# Patient Record
Sex: FEMALE | Race: WHITE | NOT HISPANIC OR LATINO | URBAN - METROPOLITAN AREA
[De-identification: names, ages, dates, MRNs, and addresses within clinical notes are randomized per-mention and may not be internally consistent; named-entity substitution may affect disease eponyms.]

---

## 2017-01-20 ENCOUNTER — COMMUNICATION - HEALTHEAST (OUTPATIENT)
Dept: FAMILY MEDICINE | Facility: CLINIC | Age: 67
End: 2017-01-20

## 2017-02-01 ENCOUNTER — OFFICE VISIT - HEALTHEAST (OUTPATIENT)
Dept: FAMILY MEDICINE | Facility: CLINIC | Age: 67
End: 2017-02-01

## 2017-02-01 DIAGNOSIS — Z01.818 PREOP EXAMINATION: ICD-10-CM

## 2017-02-01 DIAGNOSIS — F17.200 TOBACCO USE DISORDER: ICD-10-CM

## 2017-02-01 ASSESSMENT — MIFFLIN-ST. JEOR: SCORE: 1097.88

## 2017-02-03 ENCOUNTER — COMMUNICATION - HEALTHEAST (OUTPATIENT)
Dept: FAMILY MEDICINE | Facility: CLINIC | Age: 67
End: 2017-02-03

## 2017-02-06 ASSESSMENT — MIFFLIN-ST. JEOR: SCORE: 1102.41

## 2017-02-07 ENCOUNTER — ANESTHESIA - HEALTHEAST (OUTPATIENT)
Dept: SURGERY | Facility: CLINIC | Age: 67
End: 2017-02-07

## 2017-02-08 ENCOUNTER — SURGERY - HEALTHEAST (OUTPATIENT)
Dept: SURGERY | Facility: CLINIC | Age: 67
End: 2017-02-08

## 2017-02-09 ASSESSMENT — MIFFLIN-ST. JEOR: SCORE: 1102.41

## 2017-02-10 ENCOUNTER — HOME CARE/HOSPICE - HEALTHEAST (OUTPATIENT)
Dept: HOME HEALTH SERVICES | Facility: HOME HEALTH | Age: 67
End: 2017-02-10

## 2017-02-11 ENCOUNTER — COMMUNICATION - HEALTHEAST (OUTPATIENT)
Dept: HOME HEALTH SERVICES | Facility: HOME HEALTH | Age: 67
End: 2017-02-11

## 2017-02-15 ENCOUNTER — RECORDS - HEALTHEAST (OUTPATIENT)
Dept: ADMINISTRATIVE | Facility: OTHER | Age: 67
End: 2017-02-15

## 2017-02-22 ENCOUNTER — RECORDS - HEALTHEAST (OUTPATIENT)
Dept: ADMINISTRATIVE | Facility: OTHER | Age: 67
End: 2017-02-22

## 2017-03-02 ENCOUNTER — RECORDS - HEALTHEAST (OUTPATIENT)
Dept: ADMINISTRATIVE | Facility: OTHER | Age: 67
End: 2017-03-02

## 2017-03-29 ENCOUNTER — RECORDS - HEALTHEAST (OUTPATIENT)
Dept: ADMINISTRATIVE | Facility: OTHER | Age: 67
End: 2017-03-29

## 2017-04-07 ENCOUNTER — COMMUNICATION - HEALTHEAST (OUTPATIENT)
Dept: INTERNAL MEDICINE | Facility: CLINIC | Age: 67
End: 2017-04-07

## 2017-04-07 DIAGNOSIS — F41.8 DEPRESSION WITH ANXIETY: ICD-10-CM

## 2017-04-07 DIAGNOSIS — E78.00 HYPERCHOLESTEREMIA: ICD-10-CM

## 2017-04-07 DIAGNOSIS — E03.9 HYPOTHYROIDISM: ICD-10-CM

## 2017-04-11 RX ORDER — LEVOTHYROXINE SODIUM 88 UG/1
TABLET ORAL
Qty: 90 TABLET | Refills: 0 | Status: SHIPPED | OUTPATIENT
Start: 2017-04-11

## 2017-04-11 RX ORDER — LOVASTATIN 20 MG
TABLET ORAL
Qty: 90 TABLET | Refills: 0 | Status: SHIPPED | OUTPATIENT
Start: 2017-04-11 | End: 2022-12-06

## 2017-04-19 ENCOUNTER — RECORDS - HEALTHEAST (OUTPATIENT)
Dept: ADMINISTRATIVE | Facility: OTHER | Age: 67
End: 2017-04-19

## 2017-05-10 ENCOUNTER — RECORDS - HEALTHEAST (OUTPATIENT)
Dept: ADMINISTRATIVE | Facility: OTHER | Age: 67
End: 2017-05-10

## 2017-10-21 ENCOUNTER — COMMUNICATION - HEALTHEAST (OUTPATIENT)
Dept: INTERNAL MEDICINE | Facility: CLINIC | Age: 67
End: 2017-10-21

## 2017-10-21 DIAGNOSIS — F41.8 DEPRESSION WITH ANXIETY: ICD-10-CM

## 2017-10-22 RX ORDER — CITALOPRAM HYDROBROMIDE 40 MG/1
TABLET ORAL
Qty: 90 TABLET | Refills: 0 | Status: SHIPPED | OUTPATIENT
Start: 2017-10-22 | End: 2022-12-06

## 2021-05-26 ENCOUNTER — RECORDS - HEALTHEAST (OUTPATIENT)
Dept: ADMINISTRATIVE | Facility: CLINIC | Age: 71
End: 2021-05-26

## 2021-05-27 ENCOUNTER — RECORDS - HEALTHEAST (OUTPATIENT)
Dept: ADMINISTRATIVE | Facility: CLINIC | Age: 71
End: 2021-05-27

## 2021-05-30 VITALS — HEIGHT: 63 IN | WEIGHT: 132 LBS | BODY MASS INDEX: 23.39 KG/M2

## 2021-05-30 VITALS — HEIGHT: 63 IN | WEIGHT: 133 LBS | BODY MASS INDEX: 23.57 KG/M2

## 2021-05-31 ENCOUNTER — RECORDS - HEALTHEAST (OUTPATIENT)
Dept: ADMINISTRATIVE | Facility: CLINIC | Age: 71
End: 2021-05-31

## 2021-06-02 ENCOUNTER — RECORDS - HEALTHEAST (OUTPATIENT)
Dept: ADMINISTRATIVE | Facility: CLINIC | Age: 71
End: 2021-06-02

## 2021-06-08 NOTE — ANESTHESIA PROCEDURE NOTES
Peripheral Block    Patient location during procedure: pre-op  Start time: 2/8/2017 6:35 AM  End time: 2/8/2017 6:38 AM  post-op analgesia per surgeon order as noted in medical record  Staffing:  Performing  Anesthesiologist: CRISTIANO MORRISON  Preanesthetic Checklist  Completed: patient identified, site marked, risks, benefits, and alternatives discussed, timeout performed, consent obtained, at patient's request, airway assessed, oxygen available, suction available, emergency drugs available and hand hygiene performed  Peripheral Block  Block type: brachial plexus, interscalene  Prep: ChloraPrep  Patient position: supine  Patient monitoring: cardiac monitor, continuous pulse oximetry, heart rate and blood pressure  Laterality: left  Injection technique: ultrasound guided    Ultrasound used to visualize needle placement in proximity to nerve being blocked: yes   Permanent ultrasound image captured for medical record    Needle  Needle type: Stimuplex   Needle gauge: 22 G  Needle length: 2 in  no peripheral nerve catheter placed  Assessment  Injection assessment: no difficulty with injection, negative aspiration for heme, no paresthesia on injection and incremental injection

## 2021-06-08 NOTE — ANESTHESIA POSTPROCEDURE EVALUATION
Patient: Anna Lind  LEFT TOTAL SHOULDER ARTHROPLASTY  Anesthesia type: general    Patient location: Phase II Recovery  Last vitals:   Vitals:    02/08/17 1020   BP: 104/52   Pulse: 86   Resp: 16   Temp: 37  C (98.6  F)   SpO2: 98%     Post vital signs: stable  Level of consciousness: awake and responds to simple questions  Post-anesthesia pain: pain controlled  Post-anesthesia nausea and vomiting: no, has some stomach pain  Pulmonary: unassisted, nasal cannula  Cardiovascular: stable and blood pressure at baseline  Hydration: adequate  Anesthetic events: no    QCDR Measures:  ASA# 11 - Swati-op Cardiac Arrest: ASA11B - Patient did NOT experience unanticipated cardiac arrest  ASA# 12 - Swati-op Mortality Rate: ASA12B - Patient did NOT die  ASA# 13 - PACU Re-Intubation Rate: ASA13B - Patient did NOT require a new airway mgmt  ASA# 10 - Composite Anes Safety: ASA10A - No serious adverse event  ASA# 38 - New Corneal Injury: ASA38A - No new exposure keratitis or corneal abrasion in PACU    Additional Notes:

## 2021-06-08 NOTE — ANESTHESIA PROCEDURE NOTES
Peripheral Block    Patient location during procedure: pre-op  Start time: 2/8/2017 6:39 AM  End time: 2/8/2017 6:41 AM  post-op analgesia per surgeon order as noted in medical record  Staffing:  Performing  Anesthesiologist: CRISTIANO MORRISON  Preanesthetic Checklist  Completed: patient identified, site marked, risks, benefits, and alternatives discussed, timeout performed, consent obtained, at patient's request, airway assessed, oxygen available, suction available, emergency drugs available and hand hygiene performed  Peripheral Block  Block type: other, superficial cervical plexus  Prep: ChloraPrep  Patient position: supine  Patient monitoring: cardiac monitor, continuous pulse oximetry, heart rate and blood pressure  Laterality: left  Injection technique: ultrasound guided    Ultrasound used to visualize needle placement in proximity to nerve being blocked: yes   Permanent ultrasound image captured for medical record    Needle  Needle type: Stimuplex   Needle gauge: 22 G  Needle length: 2 in  no peripheral nerve catheter placed  Assessment  Injection assessment: no difficulty with injection, negative aspiration for heme, no paresthesia on injection and incremental injection

## 2021-06-08 NOTE — PROGRESS NOTES
ASSESSMENT:  1. Preop examination  For replacement left total shoulder as below.  Patient with underlying osteoarthritis.  - HM1(CBC and Differential)  - Basic Metabolic Panel  - HM1 (CBC with Diff)    2. Nicotine Dependence  Patient smokes about a pack a day, she is not interested in quitting.    PLAN:  1.  CBC and basic metabolic profile reviewed, results normal.  2.  EKG from September 12, 2016 reviewed, normal sinus rhythm with no changes.  3.  Patient is cleared for surgery.    Orders Placed This Encounter   Procedures     Basic Metabolic Panel     HM1 (CBC with Diff)     Medications Discontinued During This Encounter   Medication Reason     LORazepam (ATIVAN) 0.5 MG tablet Therapy completed     traMADol (ULTRAM) 50 mg tablet Therapy completed     senna-docusate (PERICOLACE) 8.6-50 mg tablet Therapy completed     fluticasone (FLONASE) 50 mcg/actuation nasal spray Therapy completed     aspirin 81 MG EC tablet Therapy completed       No Follow-up on file.    Patient approved for surgery with general or local anesthesia.     CHIEF COMPLAINT:  Chief Complaint   Patient presents with     Pre-op Exam     Subjective:     Scheduled Procedure: left total shoulder arthroplasty   Surgery Date:  2-8-17  Surgery Location:   Surgeon:  Dr. Patel  Recovery Plan: going home after surgery     Anna is a 66 y.o. female here for an internal medicine pre-operative consultation. The exam is requested by Dr. Patel in preparation for left total shoulder arthroplasty to be performed at [facility] on February 8, 2017. Today s examination on 2/3/2017 is done to review the underlying surgical condition of shoulder pain, clear for anesthesia, and review medical problems with appropriate changes in medications    Anna has tolerated previous surgeries well without bleeding or anesthesia difficulty.    Shoulder Pain: She has had a previous right total shoulder replacement that went well. She did have some desaturation with her  previous shoulder replacement surgery, as well as during her hysterectomy. She plans to go to physical therapy following surgery.    Current Outpatient Prescriptions   Medication Sig Dispense Refill     cholecalciferol, vitamin D3, (VITAMIN D3) 2,000 unit cap Take 1 capsule by mouth every evening.        citalopram (CELEXA) 40 MG tablet Take 40 mg by mouth every evening.       levothyroxine (SYNTHROID) 88 MCG tablet Take 1 tablet (88 mcg total) by mouth daily. 90 tablet 1     lovastatin (MEVACOR) 20 MG tablet Take 10 mg by mouth bedtime. 1/2 of 20 mg = 10 mg dose       No current facility-administered medications for this visit.      Allergies   Allergen Reactions     Buprenorphine      felt like top of head would explode, nausea       Codeine      GI upset       Dye Cough     Color yellow #5 dye      Ibuprofen      GI bleed, blood in stool       Latex Itching     ,vaginal infections latex  diaphram     Magnesium Citrate Nausea And Vomiting     Oxycodone-Acetaminophen      vitals decreased       Pentazocine Lactate      GI upset       Propoxyphene N-Acetaminophen      GI upset       Sulfa (Sulfonamide Antibiotics)      topical per pt, , had swelling in perinuem       Talwin Compound Nausea And Vomiting     Tetanus Toxoid Hives     Hives bottom of feet     Immunization History   Administered Date(s) Administered     Hep A, historic 08/12/2009, 03/08/2010     Influenza, inj, historic 02/14/2007, 09/18/2009, 11/18/2012, 10/14/2013, 09/15/2016     Influenza, seasonal,quad inj 36+ mos 10/02/2015     Influenza, seasonal,quad inj 6-35 mos 09/26/2014     Pneumo Polysac 23-V 09/26/2014     ZOSTER 11/16/2016     Patient Active Problem List   Diagnosis     Osteopenia     Localized Primary Osteoarthritis Of The Carpometacarpal Joint     Nicotine Dependence     Hypercholesterolemia     Osteoarthritis     Allergies     Hypothyroidism     Incomplete Emptying Of Bladder     Depression with anxiety     Status post total replacement  of right shoulder     Social History     Social History     Marital status:      Spouse name: N/A     Number of children: 3     Years of education: N/A     Occupational History     Security Retired     [employer]     Social History Main Topics     Smoking status: Current Every Day Smoker     Packs/day: 1.00     Types: Cigarettes     Smokeless tobacco: Never Used     Alcohol use No      Comment: Rare     Drug use: No     Sexual activity: No     Other Topics Concern     Not on file     Social History Narrative     Past Surgical History:   Procedure Laterality Date     ABDOMINOPLASTY       APPENDECTOMY       BREAST BIOPSY Right       SECTION      x2     CHOLECYSTECTOMY       FINGER SURGERY Left     little finger     HEMANGIOMA EXCISION Left     upper leg     OOPHORECTOMY      Right in  and left in       MD RECONSTR TOTAL SHOULDER IMPLANT Right 2016    Procedure: RIGHT TOTAL SHOULDER ARTHROPLASTY;  Surgeon: Rickey Patel MD;  Location: [facility];  Service: Orthopedics     SHOULDER SURGERY Bilateral      TOTAL ABDOMINAL HYSTERECTOMY       Family History   Problem Relation Age of Onset     Hyperlipidemia Mother      Hypertension Mother      CABG Father      Hyperlipidemia Father      Hypertension Father      Asthma Sister      No Medical Problems Daughter      Breast cancer Maternal Grandmother      Emphysema Maternal Grandfather      Heart disease Paternal Grandmother      No Medical Problems Paternal Grandfather      No Medical Problems Maternal Aunt      No Medical Problems Paternal Aunt      No Medical Problems Son      Hyperlipidemia Brother      Hypertension Brother      Hyperlipidemia Brother      Hypertension Brother      BRCA 1/2 Neg Hx      Cancer Neg Hx      Colon cancer Neg Hx      Endometrial cancer Neg Hx      Ovarian cancer Neg Hx      History of Present Illness  Recent Health  Fever: no  Chills: no  Fatigue: no  Chest Pain: no  Cough: no  Dyspnea: no  Urinary  Frequency: no  Nausea:no   Vomiting: no  Diarrhea: no  Abdominal Pain: no  Easy Bruising: no  Lower Extremity Swelling:no   Poor Exercise Tolerance:no     Pertinent History  Prior Anesthesia: yes  Previous Anesthesia Reaction:  no  Diabetes:no     Cardiovascular Disease:no   Pulmonary Disease: yes, slight COPD  Renal Disease:no   GI Disease: no  Sleep Apnea: no  Thromboembolic Problems: no  Clotting Disorder:no   Bleeding Disorder:no   Transfusion Reaction: no  Impaired Immunity:no    Steroid use in the last 6 months: no   Frequent Aspirin use: no    Review of Systems  Review of Systems   She take supplemental Vitamin D. She controls her cholesterol with lovastatin. She treats her depression with anxiety with citalopram. She uses Synthroid for hypothyroidism. She continues to smoke less than one pack per day and is not interested in quitting at this time. She has experienced frequent post-nasal drip and congestion. All other systems are negative.    Objective:      Vitals:    02/01/17 1400   BP: 120/74   Pulse: 86   Resp: 12   Temp: 98.1  F (36.7  C)   SpO2: 97%     Wt Readings from Last 3 Encounters:   02/01/17 132 lb (59.9 kg)   11/16/16 128 lb 14.4 oz (58.5 kg)   09/29/16 132 lb (59.9 kg)     BP Readings from Last 3 Encounters:   02/01/17 120/74   11/16/16 110/68   09/30/16 110/46     Body mass index is 23.38 kg/(m^2).    Physical Exam:  Constitutional: she appears well-developed and well-nourished.   HENT:   Right Ear: External ear normal.   Left Ear: External ear normal.   Nose: Nose normal.   Mouth/Throat: Oropharynx is clear and moist.   Eyes: Conjunctivae and EOM are normal. Pupils are equal, round, and reactive to light. Right eye exhibits no discharge. Left eye exhibits no discharge.   Neck: No thyromegaly present.   Cardiovascular: Normal rate, regular rhythm and normal heart sounds. No murmur heard.  Pulmonary/Chest: Effort normal and breath sounds normal.   Abdominal: Soft. Bowel sounds are normal. He  exhibits no distension and no mass. There is no tenderness. There is no rebound and no guarding.   Genitourinary (Female): Normally developed genitalia with no external lesions or eruptions.    Musculoskeletal: Normal range of motion.   No joint swelling or deformity.   Lymphadenopathy:     He has no cervical adenopathy.   Neurological: she is alert. she has normal reflexes.   Skin: Skin is warm and dry. No rash noted.   Psychiatric: she has a normal mood and affect.     EKG: Normal sinus rhythm.    DATA REVIEWED:  Additional History from Old Records Summarized (2): None  Decision to Obtain Records (1): None  Radiology Tests Summarized or Ordered (1): None  Labs Reviewed or Ordered (1): Ordered labs.  Medicine Test Summarized or Ordered (1): None  Independent Review of EKG, X-RAY, or RAPID STREP (2 each): Reviewed 9/12/2016 EKG image.    The visit lasted a total of 13 minutes face to face with the patient. Over 50% of the time was spent counseling and educating the patient about preparation for surgery.    IEdu, am scribing for and in the presence of, Dr. Linton.    IDr. Linton, personally performed the services described in this documentation, as scribed by Edu Reese in my presence, and it is both accurate and complete.    Total Data Points: 3

## 2021-06-08 NOTE — ANESTHESIA CARE TRANSFER NOTE
Last vitals:   Vitals:    02/08/17 0935   BP: 138/63   Pulse: (!) 103   Resp: 14   Temp: 37.2  C (99  F)   SpO2: 99%     Patient's level of consciousness is awake  Spontaneous respirations: yes  Maintains airway independently: yes  Dentition unchanged: yes  Oropharynx: oropharynx clear of all foreign objects    QCDR Measures:  ASA# 20 - Surgical Safety Checklist: ASA20A - Safety Checks Done  PQRS# 430 - Adult PONV Prevention: 4558F - Pt received => 2 anti-emetic agents (different classes) preop & intraop  ASA# 8 - Peds PONV Prevention: NA - Not pediatric patient, not GA or 2 or more risk factors NOT present  PQRS# 424 - Swati-op Temp Management: 4559F - At least one body temp DOCUMENTED => 35.5C or 95.9F within required timeframe  PQRS# 426 - PACU Transfer Protocol: - Transfer of care checklist used  ASA# 14 - Acute Post-op Pain: ASA14B - Patient did NOT experience pain >= 7 out of 10    I completed my SBAR handoff to the receiving nurse per policy and procedure.

## 2021-06-08 NOTE — ANESTHESIA PREPROCEDURE EVALUATION
Anesthesia Evaluation      Patient summary reviewed   History of anesthetic complications (PONV)     Airway   Mallampati: II  Neck ROM: full   Pulmonary - negative ROS and normal exam   (+) a smoker                         Cardiovascular - negative ROS and normal exam  Exercise tolerance: > or = 4 METS  (+) , hypercholesterolemia,     ECG reviewed        Neuro/Psych - negative ROS   (+) depression, anxiety/panic attacks,     Endo/Other - negative ROS   (+) hypothyroidism,      GI/Hepatic/Renal - negative ROS      Other findings: AW proven easy to intubate recently.      Dental - normal exam                        Anesthesia Plan  Planned anesthetic: general endotracheal and peripheral nerve block  Interscalene block for postop pain control per surgeon request  Scop patch, IV antiemetics, ketamine  ASA 2   Induction: intravenous   Anesthetic plan and risks discussed with: patient    Post-op plan: routine recovery

## 2021-06-15 PROBLEM — Z96.612 STATUS POST TOTAL REPLACEMENT OF LEFT SHOULDER: Status: ACTIVE | Noted: 2017-02-08

## 2021-06-27 ENCOUNTER — HEALTH MAINTENANCE LETTER (OUTPATIENT)
Age: 71
End: 2021-06-27

## 2021-07-21 ENCOUNTER — RECORDS - HEALTHEAST (OUTPATIENT)
Dept: ADMINISTRATIVE | Facility: CLINIC | Age: 71
End: 2021-07-21

## 2021-07-22 ENCOUNTER — RECORDS - HEALTHEAST (OUTPATIENT)
Dept: FAMILY MEDICINE | Facility: CLINIC | Age: 71
End: 2021-07-22

## 2021-07-22 DIAGNOSIS — Z12.31 OTHER SCREENING MAMMOGRAM: ICD-10-CM

## 2021-10-17 ENCOUNTER — HEALTH MAINTENANCE LETTER (OUTPATIENT)
Age: 71
End: 2021-10-17

## 2021-10-18 ENCOUNTER — APPOINTMENT (OUTPATIENT)
Dept: URBAN - METROPOLITAN AREA CLINIC 260 | Age: 71
Setting detail: DERMATOLOGY
End: 2021-10-19

## 2021-10-18 VITALS — WEIGHT: 145 LBS | HEIGHT: 63 IN

## 2021-10-18 DIAGNOSIS — L81.4 OTHER MELANIN HYPERPIGMENTATION: ICD-10-CM

## 2021-10-18 DIAGNOSIS — D22 MELANOCYTIC NEVI: ICD-10-CM

## 2021-10-18 DIAGNOSIS — L82.0 INFLAMED SEBORRHEIC KERATOSIS: ICD-10-CM

## 2021-10-18 DIAGNOSIS — L82.1 OTHER SEBORRHEIC KERATOSIS: ICD-10-CM

## 2021-10-18 DIAGNOSIS — Z71.89 OTHER SPECIFIED COUNSELING: ICD-10-CM

## 2021-10-18 DIAGNOSIS — D18.0 HEMANGIOMA: ICD-10-CM

## 2021-10-18 DIAGNOSIS — L73.8 OTHER SPECIFIED FOLLICULAR DISORDERS: ICD-10-CM

## 2021-10-18 PROBLEM — D22.5 MELANOCYTIC NEVI OF TRUNK: Status: ACTIVE | Noted: 2021-10-18

## 2021-10-18 PROBLEM — D48.5 NEOPLASM OF UNCERTAIN BEHAVIOR OF SKIN: Status: ACTIVE | Noted: 2021-10-18

## 2021-10-18 PROBLEM — D18.01 HEMANGIOMA OF SKIN AND SUBCUTANEOUS TISSUE: Status: ACTIVE | Noted: 2021-10-18

## 2021-10-18 PROCEDURE — OTHER EDUCATIONAL RESOURCES PROVIDED: OTHER

## 2021-10-18 PROCEDURE — 17110 DESTRUCT B9 LESION 1-14: CPT

## 2021-10-18 PROCEDURE — 11102 TANGNTL BX SKIN SINGLE LES: CPT | Mod: 59

## 2021-10-18 PROCEDURE — OTHER COUNSELING: OTHER

## 2021-10-18 PROCEDURE — OTHER LIQUID NITROGEN: OTHER

## 2021-10-18 PROCEDURE — 11103 TANGNTL BX SKIN EA SEP/ADDL: CPT | Mod: 59

## 2021-10-18 PROCEDURE — OTHER ADDITIONAL NOTES: OTHER

## 2021-10-18 PROCEDURE — 99203 OFFICE O/P NEW LOW 30 MIN: CPT | Mod: 25

## 2021-10-18 PROCEDURE — OTHER BIOPSY BY SHAVE METHOD: OTHER

## 2021-10-18 ASSESSMENT — LOCATION DETAILED DESCRIPTION DERM
LOCATION DETAILED: LEFT INFERIOR MEDIAL FOREHEAD
LOCATION DETAILED: INFERIOR THORACIC SPINE
LOCATION DETAILED: LEFT LATERAL BREAST 3-4:00 REGION
LOCATION DETAILED: RIGHT INFERIOR CENTRAL MALAR CHEEK
LOCATION DETAILED: LEFT SUPERIOR LATERAL MALAR CHEEK
LOCATION DETAILED: RIGHT SUPERIOR LATERAL MALAR CHEEK
LOCATION DETAILED: SUBXIPHOID

## 2021-10-18 ASSESSMENT — LOCATION ZONE DERM
LOCATION ZONE: FACE
LOCATION ZONE: TRUNK

## 2021-10-18 ASSESSMENT — LOCATION SIMPLE DESCRIPTION DERM
LOCATION SIMPLE: UPPER BACK
LOCATION SIMPLE: RIGHT CHEEK
LOCATION SIMPLE: LEFT BREAST
LOCATION SIMPLE: ABDOMEN
LOCATION SIMPLE: LEFT CHEEK
LOCATION SIMPLE: LEFT FOREHEAD

## 2021-10-18 NOTE — HPI: FULL BODY SKIN EXAMINATION
How Severe Are Your Spot(S)?: mild
What Type Of Note Output Would You Prefer (Optional)?: Standard Output
What Is The Reason For Today's Visit?: Full Body Skin Examination
What Is The Reason For Today's Visit? (Being Monitored For X): the development of new lesions
Additional History: Patient has no specific concerns at this time. Her sister had squamous cell carcinoma.

## 2021-10-18 NOTE — PROCEDURE: LIQUID NITROGEN
Post-Care Instructions: I reviewed with the patient in detail post-care instructions. Patient is to wear sunprotection, and avoid picking at any of the treated lesions. Pt may apply Vaseline to crusted or scabbing areas.
Show Aperture Variable?: Yes
Include Z78.9 (Other Specified Conditions Influencing Health Status) As An Associated Diagnosis?: No
Detail Level: Simple
Consent: The patient's consent was obtained including but not limited to risks of crusting, scabbing, blistering, scarring, darker or lighter pigmentary change, recurrence, incomplete removal and infection.
Medical Necessity Clause: This procedure was medically necessary because the lesions that were treated were:
Medical Necessity Information: It is in your best interest to select a reason for this procedure from the list below. All of these items fulfill various CMS LCD requirements except the new and changing color options.

## 2021-10-18 NOTE — PROCEDURE: BIOPSY BY SHAVE METHOD
Hide Accession Number?: No
Notification Instructions: Patient will be notified of biopsy results. However, patient instructed to call the office if not contacted within 2 weeks.
Detail Level: Detailed
Size Of Lesion In Cm: 0
Was A Bandage Applied: Yes
Wound Care: Petrolatum
Biopsy Method: Dermablade
Silver Nitrate Text: The wound bed was treated with silver nitrate after the biopsy was performed.
Post-Care Instructions: I reviewed with the patient in detail post-care instructions. Patient is to keep the biopsy site dry overnight, and then apply bacitracin twice daily until healed. Patient may apply hydrogen peroxide soaks to remove any crusting.
Electrodesiccation And Curettage Text: The wound bed was treated with electrodesiccation and curettage after the biopsy was performed.
Billing Type: Third-Party Bill
Type Of Destruction Used: Curettage
Electrodesiccation Text: The wound bed was treated with electrodesiccation after the biopsy was performed.
Cryotherapy Text: The wound bed was treated with cryotherapy after the biopsy was performed.
Depth Of Biopsy: dermis
Consent: Written consent was obtained and risks were reviewed including but not limited to scarring, infection, bleeding, scabbing, incomplete removal, nerve damage and allergy to anesthesia.
Dressing: bandage
Anesthesia Volume In Cc (Will Not Render If 0): 0.5
Curettage Text: The wound bed was treated with curettage after the biopsy was performed.
Anesthesia Type: 1% lidocaine with epinephrine
Biopsy Type: H and E
Information: Selecting Yes will display possible errors in your note based on the variables you have selected. This validation is only offered as a suggestion for you. PLEASE NOTE THAT THE VALIDATION TEXT WILL BE REMOVED WHEN YOU FINALIZE YOUR NOTE. IF YOU WANT TO FAX A PRELIMINARY NOTE YOU WILL NEED TO TOGGLE THIS TO 'NO' IF YOU DO NOT WANT IT IN YOUR FAXED NOTE.
Hemostasis: Drysol

## 2022-02-14 ENCOUNTER — APPOINTMENT (OUTPATIENT)
Dept: URBAN - METROPOLITAN AREA CLINIC 260 | Age: 72
Setting detail: DERMATOLOGY
End: 2022-02-15

## 2022-02-14 VITALS — WEIGHT: 145 LBS | HEIGHT: 63 IN

## 2022-02-14 DIAGNOSIS — L91.8 OTHER HYPERTROPHIC DISORDERS OF THE SKIN: ICD-10-CM

## 2022-02-14 DIAGNOSIS — L57.8 OTHER SKIN CHANGES DUE TO CHRONIC EXPOSURE TO NONIONIZING RADIATION: ICD-10-CM

## 2022-02-14 DIAGNOSIS — D485 NEOPLASM OF UNCERTAIN BEHAVIOR OF SKIN: ICD-10-CM

## 2022-02-14 PROBLEM — D48.5 NEOPLASM OF UNCERTAIN BEHAVIOR OF SKIN: Status: ACTIVE | Noted: 2022-02-14

## 2022-02-14 PROCEDURE — 11103 TANGNTL BX SKIN EA SEP/ADDL: CPT

## 2022-02-14 PROCEDURE — 99213 OFFICE O/P EST LOW 20 MIN: CPT | Mod: 25

## 2022-02-14 PROCEDURE — OTHER MIPS QUALITY: OTHER

## 2022-02-14 PROCEDURE — OTHER BIOPSY BY SHAVE METHOD: OTHER

## 2022-02-14 PROCEDURE — 11102 TANGNTL BX SKIN SINGLE LES: CPT

## 2022-02-14 PROCEDURE — OTHER COUNSELING: OTHER

## 2022-02-14 ASSESSMENT — LOCATION DETAILED DESCRIPTION DERM
LOCATION DETAILED: RIGHT LATERAL SUPERIOR CHEST
LOCATION DETAILED: LEFT AXILLARY TAIL OF BREAST
LOCATION DETAILED: LEFT LATERAL UPPER BACK
LOCATION DETAILED: RIGHT INFERIOR UPPER BACK
LOCATION DETAILED: LEFT SUPERIOR LATERAL MIDBACK
LOCATION DETAILED: LEFT INFERIOR UPPER BACK
LOCATION DETAILED: RIGHT SUPERIOR LATERAL MIDBACK
LOCATION DETAILED: LEFT SUPERIOR UPPER BACK

## 2022-02-14 ASSESSMENT — LOCATION SIMPLE DESCRIPTION DERM
LOCATION SIMPLE: LEFT LOWER BACK
LOCATION SIMPLE: LEFT UPPER BACK
LOCATION SIMPLE: CHEST
LOCATION SIMPLE: RIGHT LOWER BACK
LOCATION SIMPLE: LEFT BREAST
LOCATION SIMPLE: RIGHT UPPER BACK

## 2022-02-14 ASSESSMENT — LOCATION ZONE DERM: LOCATION ZONE: TRUNK

## 2022-02-14 NOTE — PROCEDURE: BIOPSY BY SHAVE METHOD
Anesthesia Type: 1% lidocaine with epinephrine and a 1:10 solution of 8.4% sodium bicarbonate
Additional Anesthesia Volume In Cc (Will Not Render If 0): 0
Type Of Destruction Used: Curettage
Wound Care: Petrolatum
Anesthesia Volume In Cc (Will Not Render If 0): 0.3
Billing Type: Third-Party Bill
Electrodesiccation Text: The wound bed was treated with electrodesiccation after the biopsy was performed.
Information: Selecting Yes will display possible errors in your note based on the variables you have selected. This validation is only offered as a suggestion for you. PLEASE NOTE THAT THE VALIDATION TEXT WILL BE REMOVED WHEN YOU FINALIZE YOUR NOTE. IF YOU WANT TO FAX A PRELIMINARY NOTE YOU WILL NEED TO TOGGLE THIS TO 'NO' IF YOU DO NOT WANT IT IN YOUR FAXED NOTE.
Hide Additional Anticipated Plan?: No
Depth Of Biopsy: dermis
Silver Nitrate Text: The wound bed was treated with silver nitrate after the biopsy was performed.
Post-Care Instructions: I reviewed with the patient in detail post-care instructions. Patient is to keep the biopsy site dry overnight, and then apply bacitracin twice daily until healed. Patient may apply hydrogen peroxide soaks to remove any crusting.
Dressing: bandage
Curettage Text: The wound bed was treated with curettage after the biopsy was performed.
Path Notes (To The Dermatopathologist): Please check margins
Biopsy Method: Dermablade
Cryotherapy Text: The wound bed was treated with cryotherapy after the biopsy was performed.
Detail Level: Detailed
Consent: Written consent was obtained and risks were reviewed including but not limited to scarring, infection, bleeding, scabbing, incomplete removal, nerve damage and allergy to anesthesia.
Was A Bandage Applied: Yes
Hemostasis: Drysol
Electrodesiccation And Curettage Text: The wound bed was treated with electrodesiccation and curettage after the biopsy was performed.
Biopsy Type: H and E
Notification Instructions: Patient will be notified of biopsy results. However, patient instructed to call the office if not contacted within 2 weeks.

## 2022-06-01 ENCOUNTER — TRANSFERRED RECORDS (OUTPATIENT)
Dept: HEALTH INFORMATION MANAGEMENT | Facility: CLINIC | Age: 72
End: 2022-06-01

## 2022-07-23 ENCOUNTER — HEALTH MAINTENANCE LETTER (OUTPATIENT)
Age: 72
End: 2022-07-23

## 2022-09-13 ENCOUNTER — TRANSCRIBE ORDERS (OUTPATIENT)
Dept: OTHER | Age: 72
End: 2022-09-13

## 2022-09-13 DIAGNOSIS — E05.00 GRAVES DISEASE: Primary | ICD-10-CM

## 2022-10-01 ENCOUNTER — HEALTH MAINTENANCE LETTER (OUTPATIENT)
Age: 72
End: 2022-10-01

## 2022-11-03 ENCOUNTER — APPOINTMENT (OUTPATIENT)
Dept: URBAN - METROPOLITAN AREA CLINIC 260 | Age: 72
Setting detail: DERMATOLOGY
End: 2022-11-04

## 2022-11-03 VITALS — HEIGHT: 64 IN | WEIGHT: 145 LBS

## 2022-11-03 DIAGNOSIS — Z71.89 OTHER SPECIFIED COUNSELING: ICD-10-CM

## 2022-11-03 DIAGNOSIS — L82.1 OTHER SEBORRHEIC KERATOSIS: ICD-10-CM

## 2022-11-03 DIAGNOSIS — L21.8 OTHER SEBORRHEIC DERMATITIS: ICD-10-CM

## 2022-11-03 DIAGNOSIS — L81.4 OTHER MELANIN HYPERPIGMENTATION: ICD-10-CM

## 2022-11-03 DIAGNOSIS — L82.0 INFLAMED SEBORRHEIC KERATOSIS: ICD-10-CM

## 2022-11-03 DIAGNOSIS — D22 MELANOCYTIC NEVI: ICD-10-CM

## 2022-11-03 DIAGNOSIS — D18.0 HEMANGIOMA: ICD-10-CM

## 2022-11-03 DIAGNOSIS — L57.8 OTHER SKIN CHANGES DUE TO CHRONIC EXPOSURE TO NONIONIZING RADIATION: ICD-10-CM

## 2022-11-03 PROBLEM — D22.5 MELANOCYTIC NEVI OF TRUNK: Status: ACTIVE | Noted: 2022-11-03

## 2022-11-03 PROBLEM — D18.01 HEMANGIOMA OF SKIN AND SUBCUTANEOUS TISSUE: Status: ACTIVE | Noted: 2022-11-03

## 2022-11-03 PROCEDURE — OTHER COUNSELING: OTHER

## 2022-11-03 PROCEDURE — OTHER LIQUID NITROGEN: OTHER

## 2022-11-03 PROCEDURE — 99214 OFFICE O/P EST MOD 30 MIN: CPT | Mod: 25

## 2022-11-03 PROCEDURE — OTHER PRESCRIPTION: OTHER

## 2022-11-03 PROCEDURE — OTHER MIPS QUALITY: OTHER

## 2022-11-03 PROCEDURE — 17110 DESTRUCT B9 LESION 1-14: CPT

## 2022-11-03 RX ORDER — KETOCONAZOLE 20 MG/G
QD CREAM TOPICAL BID
Qty: 60 | Refills: 2 | Status: ERX | COMMUNITY
Start: 2022-11-03

## 2022-11-03 ASSESSMENT — LOCATION DETAILED DESCRIPTION DERM
LOCATION DETAILED: LEFT CLAVICULAR SKIN
LOCATION DETAILED: LEFT MEDIAL SUPERIOR CHEST
LOCATION DETAILED: LEFT MEDIAL UPPER BACK
LOCATION DETAILED: LEFT INFERIOR MEDIAL UPPER BACK
LOCATION DETAILED: RIGHT SUPERIOR MEDIAL UPPER BACK
LOCATION DETAILED: INFERIOR THORACIC SPINE
LOCATION DETAILED: LEFT SUPERIOR FOREHEAD

## 2022-11-03 ASSESSMENT — LOCATION SIMPLE DESCRIPTION DERM
LOCATION SIMPLE: LEFT CLAVICULAR SKIN
LOCATION SIMPLE: UPPER BACK
LOCATION SIMPLE: LEFT FOREHEAD
LOCATION SIMPLE: CHEST
LOCATION SIMPLE: LEFT UPPER BACK
LOCATION SIMPLE: RIGHT UPPER BACK

## 2022-11-03 ASSESSMENT — LOCATION ZONE DERM
LOCATION ZONE: FACE
LOCATION ZONE: TRUNK

## 2022-11-03 NOTE — PROCEDURE: LIQUID NITROGEN
Detail Level: Detailed
Show Aperture Variable?: Yes
Consent: The patient's consent was obtained including but not limited to risks of crusting, scabbing, blistering, scarring, darker or lighter pigmentary change, recurrence, incomplete removal and infection.
Spray Paint Technique: No
Medical Necessity Information: It is in your best interest to select a reason for this procedure from the list below. All of these items fulfill various CMS LCD requirements except the new and changing color options.
Spray Paint Text: The liquid nitrogen was applied to the skin utilizing a spray paint frosting technique.
Post-Care Instructions: I reviewed with the patient in detail post-care instructions. Patient is to wear sunprotection, and avoid picking at any of the treated lesions. Pt may apply Vaseline to crusted or scabbing areas.
Medical Necessity Clause: This procedure was medically necessary because the lesions that were treated were:

## 2022-11-03 NOTE — PROCEDURE: MIPS QUALITY
Quality 431: Preventive Care And Screening: Unhealthy Alcohol Use - Screening: Patient not identified as an unhealthy alcohol user when screened for unhealthy alcohol use using a systematic screening method
Detail Level: Generalized
Quality 110: Preventive Care And Screening: Influenza Immunization: Influenza Immunization Ordered or Recommended, but not Administered due to system reason
Quality 130: Documentation Of Current Medications In The Medical Record: Current Medications Documented
Quality 226: Preventive Care And Screening: Tobacco Use: Screening And Cessation Intervention: Patient screened for tobacco use and is an ex/non-smoker

## 2022-12-05 NOTE — PROGRESS NOTES
"     Referring physician: Dr. Edwige Tate    HPI: She was seen by Dr. Tate at [facility] in 6/2022. 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 IOP was 22/22 by applanation with report of full OCT RNFL. Also had cataract surgery both eyes about 3 years ago. She currently takes levothyroxine 88 mcg tablet post BLUM in 2007.     Her eye symptoms first started approximately several months ago. About 4 months ago, she will have difficult pinpoint sensation at the front of her eye. However, she does not know if there is additional pain with eye movements. This will occurs about once every week and half. Today, the patient states that her eyes are extremely light sensitive. Both eyes seem to be irritated. The left eye worse than the right eye. She needs to use Night Gel for relief. They also become watery, blurry, and achy many times. The tears will run down her face and cheek. She also feels that her left eye is more visible from the top lid area. The appearance of her eyes overall cause her significant distress.      In addition, she is having some difficulties with the prisms in her glasses where she will have to remove them.   She will have to remove from several hours to a day before readjusting back to the prism. She feels that intermittently her need for prisms resolved. The patient describes the feeling as \"not having the right prescription\".     She started wearing prisms around 6 years ago.     Thyroid history:  Diagnosed when? 2006  BLUM: 2007  Thyroidectomy: none    TSI: None    TSH: 0.44 (5/17/2022)    Ocular history:   Orbital decompression: none  Strabismus surgery: none  Eyelid surgery: none    Medical history:  Patient Active Problem List   Diagnosis     Osteopenia     Localized Primary Osteoarthritis Of The Carpometacarpal Joint     Nicotine Dependence     Hypercholesterolemia     Osteoarthritis     Allergies     Hypothyroidism     Incomplete Emptying " Of Bladder     Depression with anxiety     Status post total replacement of right shoulder     Status post total replacement of left shoulder       Patient has a current medication list which includes the following prescription(s): aspirin, cholecalciferol, citalopram, citalopram, hydromorphone, levothyroxine, loperamide, lovastatin, lovastatin, polyethylene glycol-propylene glycol, and senna-docusate.    Patient  reports that she has been smoking cigarettes and cigarettes. She has been smoking an average of 1 pack per day. She has never used smokeless tobacco. She reports that she does not drink alcohol and does not use drugs.      Exam:   Melvi (base): 91 19/19     Better/worse same: n/a  Strabismus (better/worse/same): n/a  Eyelid retraction (better/worse/same): n/a    RICHARD Score:  1. Spontaneous orbital pain.     1  2. Gaze evoked orbital pain.     1  3. Eyelid swelling due to active thyroid eye disease  0  4. Eyelid erythema.      0  5. Conjunctival redness due to active thyroid eye disease . 1  6. Chemosis.        1  7. Inflammation of caruncle OR plica.   0    RICHARD SCORE = 4/7    Pichardo Diplopia Score = 0  0 = no diplopia  1 = intermittent (when tired, upon waking, end of day etc)  2 = inconstant (extreme gazes)  3 = constant    1.  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  She has a strabismus pattern compatible with thyroid eye disease suggestive of bilateral inferior rectus restriction.  We will check serum TSI and CT orbits.      Follow-up in [facility] thyroid eye disease clinic 3 to 4 months for repeat evaluation.         45 minutes were spent on the date of the encounter by me doing chart review, history and exam, documentation, and further activities as noted above    Complete documentation of historical and exam elements from today's encounter  can be found in the full encounter summary report (not reduplicated in this progress note).  I personally re-obtained the chief complaint(s) and history of present illness.  I confirmed and edited as necessary the review of systems, past medical/surgical history, family history, social history, and examination findings as documented by others; and I examined the patient myself.  I personally reviewed the relevant tests, images, and reports as documented above.  I formulated and edited as necessary the assessment and plan and discussed the findings and management plan with the patient and family     A medical student was involved in the care of the patient. I was present with the medical student who participated in the service and in the documentation of the note. I have  verified the history and personally performed the physical exam and medical decision making. I extensively reviewed and edited when necessary the assessment and plan. I agree with the assessment and plan of care as documented in the note    MD TRACE Carter, MS4

## 2022-12-06 ENCOUNTER — OFFICE VISIT (OUTPATIENT)
Dept: OPHTHALMOLOGY | Facility: CLINIC | Age: 72
End: 2022-12-06
Attending: STUDENT IN AN ORGANIZED HEALTH CARE EDUCATION/TRAINING PROGRAM
Payer: MEDICARE

## 2022-12-06 ENCOUNTER — HOSPITAL ENCOUNTER (OUTPATIENT)
Dept: CT IMAGING | Facility: CLINIC | Age: 72
Discharge: HOME | End: 2022-12-06
Attending: OPHTHALMOLOGY
Payer: MEDICARE

## 2022-12-06 DIAGNOSIS — E05.00 GRAVES DISEASE: ICD-10-CM

## 2022-12-06 DIAGNOSIS — H50.21 HYPERTROPIA OF RIGHT EYE: ICD-10-CM

## 2022-12-06 DIAGNOSIS — H05.20 PROPTOSIS: ICD-10-CM

## 2022-12-06 DIAGNOSIS — H05.20 PROPTOSIS: Primary | ICD-10-CM

## 2022-12-06 PROCEDURE — G0463 HOSPITAL OUTPT CLINIC VISIT: HCPCS | Mod: 25

## 2022-12-06 PROCEDURE — 84445 ASSAY OF TSI GLOBULIN: CPT

## 2022-12-06 PROCEDURE — 36415 COLL VENOUS BLD VENIPUNCTURE: CPT

## 2022-12-06 PROCEDURE — 92060 SENSORIMOTOR EXAMINATION: CPT | Mod: 26 | Performed by: OPHTHALMOLOGY

## 2022-12-06 PROCEDURE — 92060 SENSORIMOTOR EXAMINATION: CPT | Performed by: OPHTHALMOLOGY

## 2022-12-06 PROCEDURE — G1010 CDSM STANSON: HCPCS | Performed by: RADIOLOGY

## 2022-12-06 PROCEDURE — 70480 CT ORBIT/EAR/FOSSA W/O DYE: CPT | Mod: 26 | Performed by: RADIOLOGY

## 2022-12-06 PROCEDURE — 99204 OFFICE O/P NEW MOD 45 MIN: CPT | Performed by: OPHTHALMOLOGY

## 2022-12-06 PROCEDURE — 70480 CT ORBIT/EAR/FOSSA W/O DYE: CPT | Mod: MG

## 2022-12-06 RX ORDER — AMOXICILLIN 500 MG/1
TABLET, FILM COATED ORAL
COMMUNITY
Start: 2022-03-14

## 2022-12-06 RX ORDER — ATORVASTATIN CALCIUM 40 MG/1
TABLET, FILM COATED ORAL
COMMUNITY
Start: 2022-10-25

## 2022-12-06 RX ORDER — VENLAFAXINE HYDROCHLORIDE 150 MG/1
CAPSULE, EXTENDED RELEASE ORAL
COMMUNITY
Start: 2022-10-17

## 2022-12-06 ASSESSMENT — MARGIN REFLEX DISTANCE
OD_MRD1: 3
OS_MRD2: 5
OS_MRD1: 3
OD_MRD2: 3

## 2022-12-06 ASSESSMENT — CONF VISUAL FIELD
OS_NORMAL: 1
OD_INFERIOR_NASAL_RESTRICTION: 0
OS_INFERIOR_NASAL_RESTRICTION: 0
OD_SUPERIOR_NASAL_RESTRICTION: 0
OS_SUPERIOR_NASAL_RESTRICTION: 0
OS_SUPERIOR_TEMPORAL_RESTRICTION: 0
OD_SUPERIOR_TEMPORAL_RESTRICTION: 0
OD_NORMAL: 1
OD_INFERIOR_TEMPORAL_RESTRICTION: 0
OS_INFERIOR_TEMPORAL_RESTRICTION: 0
METHOD: COUNTING FINGERS

## 2022-12-06 ASSESSMENT — CUP TO DISC RATIO
OD_RATIO: 0.25
OS_RATIO: 0.25

## 2022-12-06 ASSESSMENT — VISUAL ACUITY
OS_CC: 20/25
METHOD: SNELLEN - LINEAR
OS_CC+: +2
OD_CC+: -1
OD_CC: 20/20
CORRECTION_TYPE: GLASSES

## 2022-12-06 ASSESSMENT — REFRACTION_WEARINGRX
OD_SPHERE: -0.75
OD_AXIS: 082
OS_SPHERE: -0.75
OD_CYLINDER: +1.50
OD_ADD: +2.75
OS_CYLINDER: +0.75
OS_ADD: +2.75
OS_AXIS: 065
OS_VPRISM: 5BU
OD_VPRISM: 2BD
SPECS_TYPE: PAL

## 2022-12-06 ASSESSMENT — TONOMETRY
IOP_METHOD: TONOPEN 5% KW
OS_IOP_MMHG: 24
OD_IOP_MMHG: 22
OD_IOP_MMHG: 24
OS_IOP_MMHG: 32

## 2022-12-06 ASSESSMENT — LAGOPHTHALMOS
OS_LAGOPHTHALMOS: 0
OD_LAGOPHTHALMOS: 0

## 2022-12-06 NOTE — LETTER
"2022    RE: Anna Lind  : 1950  MRN: [id number]    Dear Dr. Tate    Thank you for referring your patient, Anna Lind, to our multi-disciplinary thyroid eye disease clinic recently.  After a thorough history followed by a neuro-ophthalmology, strabismus, and oculoplastics examination, we came to the following conclusions:     Referring physician: Dr. Edwige Tate    HPI: She was seen by Dr. Tate at [facility] in 2022. 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 IOP was 22/22 by applanation with report of full OCT RNFL. Also had cataract surgery both eyes about 3 years ago. She currently takes levothyroxine 88 mcg tablet post BLUM in .     Her eye symptoms first started approximately several months ago. About 4 months ago, she will have difficult pinpoint sensation at the front of her eye. However, she does not know if there is additional pain with eye movements. This will occurs about once every week and half. Today, the patient states that her eyes are extremely light sensitive. Both eyes seem to be irritated. The left eye worse than the right eye. She needs to use Night Gel for relief. They also become watery, blurry, and achy many times. The tears will run down her face and cheek. She also feels that her left eye is more visible from the top lid area. The appearance of her eyes overall cause her significant distress.      In addition, she is having some difficulties with the prisms in her glasses where she will have to remove them.   She will have to remove from several hours to a day before readjusting back to the prism. She feels that intermittently her need for prisms resolved. The patient describes the feeling as \"not having the right prescription\".     She started wearing prisms around 6 years ago.     Thyroid history:  Diagnosed when?   BLUM:   Thyroidectomy: none  TSI: None    TSH: 0.44 (2022)    Ocular " history:   Orbital decompression: none  Strabismus surgery: none  Eyelid surgery: none    Medical history:  Patient Active Problem List   Diagnosis     Osteopenia     Localized Primary Osteoarthritis Of The Carpometacarpal Joint     Nicotine Dependence     Hypercholesterolemia     Osteoarthritis     Allergies     Hypothyroidism     Incomplete Emptying Of Bladder     Depression with anxiety     Status post total replacement of right shoulder     Status post total replacement of left shoulder       Patient has a current medication list which includes the following prescription(s): aspirin, cholecalciferol, citalopram, citalopram, hydromorphone, levothyroxine, loperamide, lovastatin, lovastatin, polyethylene glycol-propylene glycol, and senna-docusate.    Patient  reports that she has been smoking cigarettes and cigarettes. She has been smoking an average of 1 pack per day. She has never used smokeless tobacco. She reports that she does not drink alcohol and does not use drugs.      Exam:   Melvi (base): 91 19/19     Better/worse same: n/a  Strabismus (better/worse/same): n/a  Eyelid retraction (better/worse/same): n/a    RICHARD Score:  1. Spontaneous orbital pain.     1  2. Gaze evoked orbital pain.     1  3. Eyelid swelling due to active thyroid eye disease  0  4. Eyelid erythema.      0  5. Conjunctival redness due to active thyroid eye disease . 1  6. Chemosis.        1  7. Inflammation of caruncle OR plica.   0    RICHARD SCORE = 4/7  Pichardo Diplopia Score = 0  0 = no diplopia  1 = intermittent (when tired, upon waking, end of day etc)  2 = inconstant (extreme gazes)  3 = constant    1.  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  She has a strabismus pattern compatible with thyroid eye disease suggestive of bilateral inferior rectus restriction.  We will check serum TSI and CT  orbits.      Follow-up in [facility] thyroid eye disease clinic 3 to 4 months for repeat evaluation.      Thank you for trusting us with the care of your patient.  For further exam details, please feel free to contact our office for additional records.  If you wish to contact us directly regarding this patient please email us or give our clinic a call to arrange a phone conversation.    Sincerely,    Phoenix King MD  , Neuro-Ophthalmology and Adult Strabismus  Department of Ophthalmology and Visual Neurosciences  [facility]  [email or web address]    John Calabrese MD    Oculoplastic and Orbital Surgery   Department of Ophthalmology and Visual Neurosciences  [facility]  [email or web address]

## 2022-12-06 NOTE — NURSING NOTE
Chief Complaint(s) and History of Present Illness(es)     Thyroid Disease            Laterality: both eyes    Associated symptoms: double vision, tearing and photophobia    Treatments tried: artificial tears and ointment    Comments: Referred by [facility] for GONZÁLEZ Fry. Diagnosed with Graves Disease about 15 years ago. S/P BLUM 15 yrs ago.  The need for gtts and gel has increased to help with dryness. Using thera tears prn and systane gel nightly. Gel has not been helping lately. Photophobia BE everyday - wears sunglasses. Tearing has gotten worse over time. Appearance of eyes has gotten worse over time. Diplopia relieved by prism gls (6yrs), would like to wear ctx. Taking levothyroxine daily.     Inf: pt           Comments    Labs 5/17/2022 - TSH, Sensitive  0.44

## 2022-12-07 ASSESSMENT — SLIT LAMP EXAM - LIDS
COMMENTS: NORMAL
COMMENTS: NORMAL

## 2022-12-07 ASSESSMENT — EXTERNAL EXAM - LEFT EYE: OS_EXAM: NORMAL

## 2022-12-07 ASSESSMENT — EXTERNAL EXAM - RIGHT EYE: OD_EXAM: NORMAL

## 2022-12-09 LAB — TSI SER-ACNC: 5.4 TSI INDEX

## 2023-03-07 ENCOUNTER — OFFICE VISIT (OUTPATIENT)
Dept: OPHTHALMOLOGY | Facility: CLINIC | Age: 73
End: 2023-03-07
Attending: OPHTHALMOLOGY
Payer: MEDICARE

## 2023-03-07 DIAGNOSIS — H05.20 PROPTOSIS: ICD-10-CM

## 2023-03-07 DIAGNOSIS — H50.21 HYPERTROPIA OF RIGHT EYE: Primary | ICD-10-CM

## 2023-03-07 DIAGNOSIS — H57.89 THYROID EYE DISEASE: ICD-10-CM

## 2023-03-07 DIAGNOSIS — H50.22 HYPOTROPIA OF LEFT EYE: ICD-10-CM

## 2023-03-07 DIAGNOSIS — E07.9 THYROID EYE DISEASE: ICD-10-CM

## 2023-03-07 PROCEDURE — 99213 OFFICE O/P EST LOW 20 MIN: CPT | Mod: GC | Performed by: OPHTHALMOLOGY

## 2023-03-07 PROCEDURE — 92060 SENSORIMOTOR EXAMINATION: CPT | Performed by: OPHTHALMOLOGY

## 2023-03-07 PROCEDURE — G0463 HOSPITAL OUTPT CLINIC VISIT: HCPCS | Mod: 25

## 2023-03-07 ASSESSMENT — REFRACTION_WEARINGRX
OD_VPRISM: 2BD
OS_ADD: +2.75
OD_ADD: +2.75
OS_VPRISM: 5BU
SPECS_TYPE: PAL
OS_AXIS: 065
OS_SPHERE: -0.75
OD_AXIS: 082
OS_CYLINDER: +0.75
OD_CYLINDER: +1.50
OD_SPHERE: -0.75

## 2023-03-07 ASSESSMENT — CUP TO DISC RATIO
OD_RATIO: 0.25
OS_RATIO: 0.25

## 2023-03-07 ASSESSMENT — LAGOPHTHALMOS
OS_LAGOPHTHALMOS: 0
OD_LAGOPHTHALMOS: 0

## 2023-03-07 ASSESSMENT — VISUAL ACUITY
OS_CC: 20/20
OS_CC+: -3
METHOD: SNELLEN - LINEAR
CORRECTION_TYPE: GLASSES
OD_CC+: -3
OD_CC: 20/20

## 2023-03-07 ASSESSMENT — MARGIN REFLEX DISTANCE
OS_MRD2: 4
OD_MRD2: 4
OD_MRD1: 3
OS_MRD1: 3

## 2023-03-07 ASSESSMENT — PATIENT HEALTH QUESTIONNAIRE - PHQ9: SUM OF ALL RESPONSES TO PHQ QUESTIONS 1-9: 5

## 2023-03-07 ASSESSMENT — CONF VISUAL FIELD
OD_SUPERIOR_NASAL_RESTRICTION: 0
OD_SUPERIOR_TEMPORAL_RESTRICTION: 0
OS_NORMAL: 1
OS_SUPERIOR_NASAL_RESTRICTION: 0
OS_INFERIOR_NASAL_RESTRICTION: 0
OD_INFERIOR_NASAL_RESTRICTION: 0
OS_INFERIOR_TEMPORAL_RESTRICTION: 0
OD_INFERIOR_TEMPORAL_RESTRICTION: 0
METHOD: COUNTING FINGERS
OD_NORMAL: 1
OS_SUPERIOR_TEMPORAL_RESTRICTION: 0

## 2023-03-07 ASSESSMENT — SLIT LAMP EXAM - LIDS
COMMENTS: NORMAL
COMMENTS: NORMAL

## 2023-03-07 ASSESSMENT — EXTERNAL EXAM - RIGHT EYE: OD_EXAM: NORMAL

## 2023-03-07 ASSESSMENT — TONOMETRY
OD_IOP_MMHG: 22
OS_IOP_MMHG: 22

## 2023-03-07 ASSESSMENT — EXTERNAL EXAM - LEFT EYE: OS_EXAM: NORMAL

## 2023-03-07 NOTE — PROGRESS NOTES
"   Chief Complaints and History of Present Illnesses   Patient presents with     Thyroid Disease     Dry eye each eye using gel at night, feels like pain is worse at night, feels like this should be improved. Might have mild noct lag. Sharp, \"excruciating\" pain, can be either eye. Redness at times. Lots of tears each eye, doesn't use tears throughout the day. Double vision resolved with correction - prism in glasses. Notes is more dificult to read/find things in a store. Taking levothyroxine.   Has been noticing little black spots floating around in vision.      Chief Complaint(s) and History of Present Illness(es)     Thyroid Disease    In both eyes.  Associated symptoms include double vision, dryness, eye   pain, redness, tearing and photophobia.  Treatments tried include eye   drops and artificial tears. Additional comments: Dry eye each eye using   gel at night, feels like pain is worse at night, feels like this should be   improved. Might have mild noct lag. Sharp, \"excruciating\" pain, can be   either eye. Redness at times. Lots of tears each eye, doesn't use tears   throughout the day. Double vision resolved with correction - prism in   glasses. Notes is more dificult to read/find things in a store. Taking   levothyroxine.   Has been noticing little black spots floating around in vision.        Comments    Labs 12/06/2022 - Thyroid Stim Immunog 5.4       CT ORBITAL W/O CONTRAST 12/6/2022 4:28 PM     History: Chambers PROTOCOL: Probable thyroid eye disease. Strabismus,  evaluate for orbital mass or infiltrative disease of extraocular  muscles; Proptosis     ICD-10: Proptosis     Comparison: No prior similar studies       Technique: Using thin collimation multidetector helical acquisition  technique, axial, coronal, sagittal CT images were obtained through  the orbits and upper facial bones, without intravenous administration  of nonionic iodinated contrast medium.     Findings: There is no significant soft " "tissue or preseptal swelling of  the orbits. There is no fracture of the facial bones. Alignment of the  upper facial bones is normal. Bilateral borderline proptosis. No  significant thickening of the extraocular musculature.     There is no hematoma or gas within the orbits. The visualized portions  of the paranasal sinuses and mastoid air cells are clear. The  cribriform plate appears intact.                                                            Impression: Question bilateral borderline proptosis. No significant  extraocular muscle thickening to suggest thyroid orbitopathy.        interval HPI since 12/6/22:    She has episodes of sudden sharp eye pain like \"glass cutting her eye\" while sleeping that wakes her up from sleep followed by significant tearing. She reapplies the ointment but does not feel it helps significantly.     Otherwise stable diplopia with current prisms. She feels the left eye is more proptotic. No other eye complaints.    Patient quit smoking cigarettes 5 years ago. She currently vapes 5mg nicotaine daily, 1ppd.   Currently managed on levothyroxine 88mcg     Historical HPI: She was seen by Dr. Tate at [facility] in 6/2022. 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 IOP was 22/22 by applanation with report of full OCT RNFL. Also had cataract surgery both eyes about 3 years ago. She currently takes levothyroxine 88 mcg tablet post BLUM in 2007.      Her eye symptoms first started approximately several months ago. About 4 months ago, she will have difficult pinpoint sensation at the front of her eye. However, she does not know if there is additional pain with eye movements. This will occurs about once every week and half. Today, the patient states that her eyes are extremely light sensitive. Both eyes seem to be irritated. The left eye worse than the right eye. She needs to use Night Gel for relief. They also become watery, blurry, and " "achy many times. The tears will run down her face and cheek. She also feels that her left eye is more visible from the top lid area. The appearance of her eyes overall cause her significant distress.       In addition, she is having some difficulties with the prisms in her glasses where she will have to remove them.   She will have to remove from several hours to a day before readjusting back to the prism. She feels that intermittently her need for prisms resolved. The patient describes the feeling as \"not having the right prescription\".      She started wearing prisms around 6 years ago.      Referring physician: Dr. Edwige Tate    Thyroid history:  Diagnosed when? 2006  BLUM: 2007  Thyroidectomy: none     TSI: 5.4 (12/6/22)     TSH: 0.44 (5/17/2022)     TSH   Date Value Ref Range Status   11/08/2019 1.97 0.30 - 5.00 uIU/mL Final       Ocular history:   Orbital decompression: none  Strabismus surgery: none  Eyelid surgery: none     Medical history:      Patient Active Problem List   Diagnosis     Osteopenia     Localized Primary Osteoarthritis Of The Carpometacarpal Joint     Nicotine Dependence     Hypercholesterolemia     Osteoarthritis     Allergies     Hypothyroidism     Incomplete Emptying Of Bladder     Depression with anxiety     Status post total replacement of right shoulder     Status post total replacement of left shoulder         Patient has a current medication list which includes the following prescription(s): aspirin, cholecalciferol, citalopram, citalopram, hydromorphone, levothyroxine, loperamide, lovastatin, lovastatin, polyethylene glycol-propylene glycol, and senna-docusate.     Exam:   Melvi (base): 93 21/23     Better/worse same: Improved  Strabismus (better/worse/same): relatively stable  Eyelid retraction (better/worse/same): stable     RICHARD Score:  1. Spontaneous orbital pain.                                                   1 - left   2. Gaze evoked orbital pain.                           "                         0  3. Eyelid swelling due to active thyroid eye disease              1  4. Eyelid erythema.                                                                 0  5. Conjunctival redness due to active thyroid eye disease .  1  6. Chemosis.                                                                           1  7. Inflammation of caruncle OR plica.                                    0    Patients assessed after follow-up can be scored out of 10 by  including items 8-10.    8. Increase of > 2mm in proptosis.    0   9. Decrease in uniocular excursion in any direction of > 8 . 0  10. Decrease of acuity equivalent to 1 Snellen line.  0    RICHARD SCORE = 4/10       Pichardo Diplopia Score = 0 with her glasses on  0 = no diplopia  1 = intermittent (when tired, upon waking, end of day etc)  2 = inconstant (extreme gazes)  3 = constant    Assessment & Plan     1. 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  Recurrent corneal erosions- patient is SEVERELY affected by these and reports the ointment does not help sufficiently.  Will refer her to cornea to see if there is a surgical option to prevent these painful events.  Eventually we will likely perform orbital decompression which should help to reduce ocular surface exposure but may not prevent her erosions.     - appears to have active thyroid eye disease with RICHARD 4/10. TSI is elevated at 5.4. Overall stable exam today   - encouraged use of ATs throughout the day 3-4x/day, and artificial tear ointment at bedtime (when her erosions usually occur upon awakening and opening eyes)  - start lid scrubs BID and warm compresses BID  - encouraged smoking cessation  - follow up 4 months    Referral to cornea and external disease at [facility]      25 minutes were spent on the date of the encounter by me doing chart review, history and exam, documentation, and further activities as noted above    Complete documentation  of historical and exam elements from today's encounter can be found in the full encounter summary report (not reduplicated in this progress note).  I personally obtained the chief complaint(s) and history of present illness.  I confirmed and edited as necessary the review of systems, past medical/surgical history, family history, social history, and examination findings as documented by others; and I examined the patient myself.  I personally reviewed the relevant tests, images, and reports as documented above.  I formulated and edited as necessary the assessment and plan and discussed the findings and management plan with the patient and family.  I personally reviewed the ophthalmic test(s) associated with this encounter, agree with the interpretation(s) as documented by the resident/fellow, and have edited the corresponding report(s) as necessary.     MD Sarah Carter MD  Oculoplastics Fellow

## 2023-03-07 NOTE — NURSING NOTE
"Chief Complaint(s) and History of Present Illness(es)     Thyroid Disease            Laterality: both eyes    Associated symptoms: double vision, dryness, eye pain, redness and tearing    Treatments tried: eye drops and artificial tears    Comments: Dry eye each eye using gel at night, feels like pain is worse at night, feels like this should be improved. Might have mild noct lag. Sharp, \"excruciating\" pain, can be either eye. Redness at times. Lots of tears each eye, doesn't use tears throughout the day. Double vision resolved with correction - prism in glasses. Taking levothyroxine.   Has been noticing little black spots floating around in vision.           Comments    Labs 12/06/2022 - Thyroid Stim Immunog 5.4       CT ORBITAL W/O CONTRAST 12/6/2022 4:28 PM     History: ANKIT PROTOCOL: Probable thyroid eye disease. Strabismus,  evaluate for orbital mass or infiltrative disease of extraocular  muscles; Proptosis     ICD-10: Proptosis     Comparison: No prior similar studies       Technique: Using thin collimation multidetector helical acquisition  technique, axial, coronal, sagittal CT images were obtained through  the orbits and upper facial bones, without intravenous administration  of nonionic iodinated contrast medium.     Findings: There is no significant soft tissue or preseptal swelling of  the orbits. There is no fracture of the facial bones. Alignment of the  upper facial bones is normal. Bilateral borderline proptosis. No  significant thickening of the extraocular musculature.     There is no hematoma or gas within the orbits. The visualized portions  of the paranasal sinuses and mastoid air cells are clear. The  cribriform plate appears intact.                                                                      Impression: Question bilateral borderline proptosis. No significant  extraocular muscle thickening to suggest thyroid orbitopathy.     MARTHA CORNELIUS MD                "

## 2023-08-12 ENCOUNTER — HEALTH MAINTENANCE LETTER (OUTPATIENT)
Age: 73
End: 2023-08-12

## 2023-09-05 ENCOUNTER — OFFICE VISIT (OUTPATIENT)
Dept: OPHTHALMOLOGY | Facility: CLINIC | Age: 73
End: 2023-09-05
Attending: OPHTHALMOLOGY
Payer: MEDICARE

## 2023-09-05 DIAGNOSIS — E07.9 THYROID EYE DISEASE: ICD-10-CM

## 2023-09-05 DIAGNOSIS — H50.21 HYPERTROPIA OF RIGHT EYE: Primary | ICD-10-CM

## 2023-09-05 DIAGNOSIS — H57.89 THYROID EYE DISEASE: ICD-10-CM

## 2023-09-05 PROCEDURE — 92285 EXTERNAL OCULAR PHOTOGRAPHY: CPT | Performed by: OPHTHALMOLOGY

## 2023-09-05 PROCEDURE — 99213 OFFICE O/P EST LOW 20 MIN: CPT | Performed by: OPHTHALMOLOGY

## 2023-09-05 PROCEDURE — G0463 HOSPITAL OUTPT CLINIC VISIT: HCPCS | Performed by: OPHTHALMOLOGY

## 2023-09-05 PROCEDURE — 92060 SENSORIMOTOR EXAMINATION: CPT | Performed by: OPHTHALMOLOGY

## 2023-09-05 ASSESSMENT — CONF VISUAL FIELD
OD_INFERIOR_TEMPORAL_RESTRICTION: 0
OD_SUPERIOR_NASAL_RESTRICTION: 0
OS_SUPERIOR_NASAL_RESTRICTION: 0
OS_NORMAL: 1
OD_INFERIOR_NASAL_RESTRICTION: 0
OS_INFERIOR_NASAL_RESTRICTION: 0
OD_NORMAL: 1
OS_SUPERIOR_TEMPORAL_RESTRICTION: 0
OS_INFERIOR_TEMPORAL_RESTRICTION: 0
OD_SUPERIOR_TEMPORAL_RESTRICTION: 0

## 2023-09-05 ASSESSMENT — VISUAL ACUITY
OD_CC+: -1
METHOD: SNELLEN - LINEAR
OD_CC: 20/20
OS_CC: 20/20

## 2023-09-05 ASSESSMENT — REFRACTION_WEARINGRX
OD_ADD: +2.75
OS_AXIS: 065
SPECS_TYPE: PAL
OS_VPRISM: 4 BU
OD_SPHERE: -0.75
OD_CYLINDER: +1.50
OD_AXIS: 080
OS_ADD: +2.75
OS_CYLINDER: +0.75
OD_VPRISM: 2 BD
OS_SPHERE: -0.75

## 2023-09-05 ASSESSMENT — MARGIN REFLEX DISTANCE
OS_MRD1: 7
OD_MRD1: 5

## 2023-09-05 ASSESSMENT — SLIT LAMP EXAM - LIDS
COMMENTS: NORMAL
COMMENTS: NORMAL

## 2023-09-05 ASSESSMENT — TONOMETRY
IOP_METHOD: ICARE
OS_IOP_MMHG: 24
OD_IOP_MMHG: 23

## 2023-09-05 NOTE — PROGRESS NOTES
Updates since Last Visit (3/7/2023)    Patient still uses prisms (3.5 each eye) which helps her double vision significantly. She found drops that work for her regarding her recurrent corneal erosions.       Ref Range & Units  08/02/2023   TSH, Sensitive 0.30 - 4.50 uIU/mL 0.35     Prior TSH 0.21 on 6/20/23.   TSI 12/6/22: 5.4    Historical data:  CT ORBITAL W/O CONTRAST 12/6/2022 4:28 PM     History: ANKIT PROTOCOL: Probable thyroid eye disease. Strabismus,  evaluate for orbital mass or infiltrative disease of extraocular  muscles; Proptosis      Impression: Question bilateral borderline proptosis. No significant  extraocular muscle thickening to suggest thyroid orbitopathy.       Historical HPI: She was seen by Dr. Tate at [facility] in 6/2022. 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 IOP was 22/22 by applanation with report of full OCT RNFL. Also had cataract surgery both eyes about 3 years ago. She currently takes levothyroxine 88 mcg tablet post BLUM in 2007.      Her eye symptoms first started approximately several months ago. About 4 months ago, she will have difficult pinpoint sensation at the front of her eye. However, she does not know if there is additional pain with eye movements. This will occurs about once every week and half. Today, the patient states that her eyes are extremely light sensitive. Both eyes seem to be irritated. The left eye worse than the right eye. She needs to use Night Gel for relief. They also become watery, blurry, and achy many times. The tears will run down her face and cheek. She also feels that her left eye is more visible from the top lid area. The appearance of her eyes overall cause her significant distress.       In addition, she is having some difficulties with the prisms in her glasses where she will have to remove them.   She will have to remove from several hours to a day before readjusting back to the prism.  "She feels that intermittently her need for prisms resolved. The patient describes the feeling as \"not having the right prescription\".      She started wearing prisms around 6 years ago.      Referring physician: Dr. Edwige Tate     Thyroid history:  Diagnosed when? 2006  BLUM: 2007  Thyroidectomy: none     TSI: 5.4 (12/6/22)  TSH: 0.44 (5/17/2022)     Ocular history:   Orbital decompression: none  Strabismus surgery: none  Eyelid surgery: none     Medical history:        Patient Active Problem List   Diagnosis    Osteopenia    Localized Primary Osteoarthritis Of The Carpometacarpal Joint    Nicotine Dependence    Hypercholesterolemia    Osteoarthritis    Allergies    Hypothyroidism    Incomplete Emptying Of Bladder    Depression with anxiety    Status post total replacement of right shoulder    Status post total replacement of left shoulder         Patient has a current medication list which includes the following prescription(s): aspirin, cholecalciferol, citalopram, citalopram, hydromorphone, levothyroxine, loperamide, lovastatin, lovastatin, polyethylene glycol-propylene glycol, and senna-docusate.     Exam:   Melvi (base): 93 23/26     Better/worse same: worse  Strabismus (better/worse/same): relatively stable  Eyelid retraction (better/worse/same): stable     RICHARD Score:  1. Spontaneous orbital pain.                                                   1 - left   2. Gaze evoked orbital pain.                                                   0  3. Eyelid swelling due to active thyroid eye disease              1  4. Eyelid erythema.                                                                 0  5. Conjunctival redness due to active thyroid eye disease .  1  6. Chemosis.                                                                           1  7. Inflammation of caruncle OR plica.                                    0     Patients assessed after follow-up can be scored out of 10 by  including items 8-10.     8. " Increase of > 2mm in proptosis.                                            1           9. Decrease in uniocular excursion in any direction of > 8 .     0  10. Decrease of acuity equivalent to 1 Snellen line.                 0     RICHARD SCORE = 5/10     Pichardo Diplopia Score = 0 with her glasses on  0 = no diplopia  1 = intermittent (when tired, upon waking, end of day etc)  2 = inconstant (extreme gazes)  3 = constant     Assessment & Plan     1. 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 GENTEAL GEL PREVENTS THESE EVENTS.     Patient has had significant progression in her proptosis today compared to last visit.  Discussed Tepezza and patient was interested however 2 years ago she had a bout of microscopic colitis (IBD) so tepezza contraindicated.  Her disease is not severe enough at this point to recommend orbital radiation. Discussed pulse IV methylprednisolone (steroids) but these also have significant side-effects so for now mutually agreed to observe closely.      Follow-up in 3-4 months in thyroid eye disease clinic    Note to Dr. Liriano- patient's primary care physician.         Helena Gutierrez  Medical Student, MS4    25 minutes were spent on the date of the encounter by me doing chart review, history and exam, documentation, and further activities as noted above    Complete documentation of historical and exam elements from today's encounter can be found in the full encounter summary report (not reduplicated in this progress note).  I personally re-obtained the chief complaint(s) and history of present illness.  I confirmed and edited as necessary the review of systems, past medical/surgical history, family history, social history, and examination findings as documented by others; and I examined the patient myself.  I personally reviewed the relevant tests, images, and reports as documented above.  I formulated  and edited as necessary the assessment and plan and discussed the findings and management plan with the patient and family     A medical student was involved in the care of the patient. I was present with the medical student who participated in the service and in the documentation of the note. I have  verified the history and personally performed the physical exam and medical decision making. I extensively reviewed and edited when necessary the assessment and plan. I agree with the assessment and plan of care as documented in the note    Phoenix King MD

## 2023-09-05 NOTE — LETTER
2023    RE: Anna Lind  : 1950  MRN: [id number]    Dear Providers,    We saw our mutual patient, Anna Lind, in follow-up in our multi-disciplinary thyroid eye disease clinic recently.  After a thorough history followed by a neuro-ophthalmology, strabismus, and oculoplastics examination, we came to the following conclusions:      Updates since Last Visit (3/7/2023)  Patient still uses prisms (3.5 each eye) which helps her double vision significantly. She found drops that work for her regarding her recurrent corneal erosions.       Ref Range & Units  2023   TSH, Sensitive 0.30 - 4.50 uIU/mL 0.35   Prior TSH 0.21 on 23.   TSI 22: 5.4    Historical data:  CT ORBITAL W/O CONTRAST 2022 4:28 PM     History: ANKIT PROTOCOL: Probable thyroid eye disease. Strabismus,  evaluate for orbital mass or infiltrative disease of extraocular  muscles; Proptosis      Impression: Question bilateral borderline proptosis. No significant  extraocular muscle thickening to suggest thyroid orbitopathy.    Historical HPI: She was seen by Dr. Tate at [facility] in 2022. 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 IOP was 22/22 by applanation with report of full OCT RNFL. Also had cataract surgery both eyes about 3 years ago. She currently takes levothyroxine 88 mcg tablet post BLUM in .      Her eye symptoms first started approximately several months ago. About 4 months ago, she will have difficult pinpoint sensation at the front of her eye. However, she does not know if there is additional pain with eye movements. This will occurs about once every week and half. Today, the patient states that her eyes are extremely light sensitive. Both eyes seem to be irritated. The left eye worse than the right eye. She needs to use Night Gel for relief. They also become watery, blurry, and achy many times. The tears will run down her face and cheek.  "She also feels that her left eye is more visible from the top lid area. The appearance of her eyes overall cause her significant distress.       In addition, she is having some difficulties with the prisms in her glasses where she will have to remove them.   She will have to remove from several hours to a day before readjusting back to the prism. She feels that intermittently her need for prisms resolved. The patient describes the feeling as \"not having the right prescription\".      She started wearing prisms around 6 years ago.      Referring physician: Dr. Edwige Tate     Thyroid history:  Diagnosed when? 2006  BLUM: 2007  Thyroidectomy: none     TSI: 5.4 (12/6/22)  TSH: 0.44 (5/17/2022)     Ocular history:   Orbital decompression: none  Strabismus surgery: none  Eyelid surgery: none     Medical history:  Patient Active Problem List   Diagnosis    Osteopenia    Localized Primary Osteoarthritis Of The Carpometacarpal Joint    Nicotine Dependence    Hypercholesterolemia    Osteoarthritis    Allergies    Hypothyroidism    Incomplete Emptying Of Bladder    Depression with anxiety    Status post total replacement of right shoulder    Status post total replacement of left shoulder        Patient has a current medication list which includes the following prescription(s): aspirin, cholecalciferol, citalopram, citalopram, hydromorphone, levothyroxine, loperamide, lovastatin, lovastatin, polyethylene glycol-propylene glycol, and senna-docusate.     Exam:   Melvi (base): 93 23/26     Better/worse same: worse  Strabismus (better/worse/same): relatively stable  Eyelid retraction (better/worse/same): stable     RICHARD Score:  1. Spontaneous orbital pain.                                                   1 - left   2. Gaze evoked orbital pain.                                                   0  3. Eyelid swelling due to active thyroid eye disease              1  4. Eyelid erythema.                                                  "                0  5. Conjunctival redness due to active thyroid eye disease .  1  6. Chemosis.                                                                           1  7. Inflammation of caruncle OR plica.                                    0     Patients assessed after follow-up can be scored out of 10 by  including items 8-10.     8. Increase of > 2mm in proptosis.                                            1           9. Decrease in uniocular excursion in any direction of > 8 .     0  10. Decrease of acuity equivalent to 1 Snellen line.                 0     RICHARD SCORE = 5/10     Pichardo Diplopia Score = 0 with her glasses on  0 = no diplopia  1 = intermittent (when tired, upon waking, end of day etc)  2 = inconstant (extreme gazes)  3 = constant     Assessment & Plan     1. 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 GENTEAL GEL PREVENTS THESE EVENTS.     Patient has had significant progression in her proptosis today compared to last visit.  Discussed Tepezza and patient was interested however 2 years ago she had a bout of microscopic colitis (IBD) so tepezza contraindicated.  Her disease is not severe enough at this point to recommend orbital radiation. Discussed pulse IV methylprednisolone (steroids) but these also have significant side-effects so for now mutually agreed to observe closely.      Follow-up in 3-4 months in thyroid eye disease clinic    Note to Dr. Liriano- patient's primary care physician.      Thank you for trusting us with the care of your patient.  For further exam details, please feel free to contact our office for additional records.  If you wish to contact us directly regarding this patient please email us or give our clinic a call to arrange a phone conversation.    Sincerely,    Phoenix King MD  , Neuro-Ophthalmology and Adult Strabismus  Department of  Ophthalmology and Visual Neurosciences  [email or web address]    John Calabrese MD    Oculoplastic and Orbital Surgery   Department of Ophthalmology and Visual Neurosciences  [facility]  [email or web address]

## 2023-09-07 ASSESSMENT — EXTERNAL EXAM - RIGHT EYE: OD_EXAM: NORMAL

## 2023-09-07 ASSESSMENT — EXTERNAL EXAM - LEFT EYE: OS_EXAM: NORMAL

## 2023-11-28 ENCOUNTER — TELEPHONE (OUTPATIENT)
Dept: OPHTHALMOLOGY | Facility: CLINIC | Age: 73
End: 2023-11-28
Payer: COMMERCIAL

## 2023-11-28 NOTE — TELEPHONE ENCOUNTER
Due to a change in Dr. King schedule the appointment on 1/2/24 needs to be rescheduled. I attempted to contact patient but got no answer I did leave a voicemail for the patient to give the clinic a call back to get this appointment rescheduled.

## 2024-04-02 ENCOUNTER — OFFICE VISIT (OUTPATIENT)
Dept: OPHTHALMOLOGY | Facility: CLINIC | Age: 74
End: 2024-04-02
Attending: OPHTHALMOLOGY
Payer: MEDICARE

## 2024-04-02 DIAGNOSIS — H50.21 HYPERTROPIA OF RIGHT EYE: ICD-10-CM

## 2024-04-02 DIAGNOSIS — H57.89 THYROID EYE DISEASE: Primary | ICD-10-CM

## 2024-04-02 DIAGNOSIS — E07.9 THYROID EYE DISEASE: Primary | ICD-10-CM

## 2024-04-02 PROCEDURE — 99213 OFFICE O/P EST LOW 20 MIN: CPT | Mod: GC | Performed by: OPHTHALMOLOGY

## 2024-04-02 PROCEDURE — 92060 SENSORIMOTOR EXAMINATION: CPT | Mod: 26 | Performed by: OPHTHALMOLOGY

## 2024-04-02 PROCEDURE — G0463 HOSPITAL OUTPT CLINIC VISIT: HCPCS | Performed by: OPHTHALMOLOGY

## 2024-04-02 PROCEDURE — 92060 SENSORIMOTOR EXAMINATION: CPT | Performed by: OPHTHALMOLOGY

## 2024-04-02 ASSESSMENT — CONF VISUAL FIELD
OS_SUPERIOR_TEMPORAL_RESTRICTION: 0
OD_INFERIOR_NASAL_RESTRICTION: 0
OS_INFERIOR_TEMPORAL_RESTRICTION: 0
OD_SUPERIOR_TEMPORAL_RESTRICTION: 0
METHOD: COUNTING FINGERS
OD_INFERIOR_TEMPORAL_RESTRICTION: 0
OD_SUPERIOR_NASAL_RESTRICTION: 0
OS_NORMAL: 1
OD_NORMAL: 1
OS_SUPERIOR_NASAL_RESTRICTION: 0
OS_INFERIOR_NASAL_RESTRICTION: 0

## 2024-04-02 ASSESSMENT — VISUAL ACUITY
CORRECTION_TYPE: GLASSES
METHOD: SNELLEN - LINEAR
OS_CC+: +2
OD_CC: 20/20
OS_CC: 20/30

## 2024-04-02 ASSESSMENT — REFRACTION_WEARINGRX
OD_AXIS: 080
OD_ADD: +2.75
SPECS_TYPE: PAL
OS_CYLINDER: +0.75
OS_ADD: +2.75
OD_CYLINDER: +1.50
OS_SPHERE: -0.75
OD_SPHERE: -0.75
OS_VPRISM: 4 BU
OS_AXIS: 065
OD_VPRISM: 2 BD

## 2024-04-02 ASSESSMENT — TONOMETRY
OS_IOP_MMHG: 26
OD_IOP_MMHG: 23
OD_IOP_MMHG: 27
IOP_METHOD: ICARE
OS_IOP_MMHG: 25
IOP_METHOD: TONOPEN 5%

## 2024-04-02 ASSESSMENT — EXTERNAL EXAM - LEFT EYE: OS_EXAM: PROPTOSIS R>L

## 2024-04-02 ASSESSMENT — SLIT LAMP EXAM - LIDS
COMMENTS: NORMAL
COMMENTS: NORMAL

## 2024-04-02 ASSESSMENT — EXTERNAL EXAM - RIGHT EYE: OD_EXAM: PROPTOSIS R>L

## 2024-04-02 NOTE — NURSING NOTE
Chief Complaint(s) and History of Present Illness(es)       Graves Thyrotoxic Exoph              Laterality: both eyes              Comments    All gtts and china that she has tried to use every night to avoid corneal abrasion don't work. Only medication that has worked is sodium chloride hypertonicity ophthalmic china 5%, but it only works about 90% of the time. Pt frustrated that no one has explained why this keeps happening. Was advised to try a humidifier, but her lease specifically states that she can not use a humidifier.   Pt was recently seen for a labial tear, was told this was due to a drop in hormones. Pt wonders if hormones affect her eyes, as well. Pt's daughter would like to know why Jero needs to keep coming to [facility]. Pt would like a letter that she can give to her daughter with an explanation.

## 2024-04-02 NOTE — PROGRESS NOTES
Updates since Last Visit (3/7/2023)    Patient still uses prisms (3.5 each eye) which helps her double vision significantly.   All gtts and china that she has tried to use every night to avoid corneal abrasion don't work. Only medication that has worked is sodium chloride hypertonicity ophthalmic china 5%, but it only works about 90% of the time. Pt frustrated that no one has explained why this keeps happening. Was advised to try a humidifier, but her lease specifically states that she can not use a humidifier. Pt was recently seen for a labial tear, was told this was due to a drop in hormones. Pt wonders if hormones affect her eyes, as well. Pt's daughter would like to know why Jero needs to keep coming to [facility]. Pt would like a letter that she can give to her daughter with an explanation.       Ref Range & Units  08/02/2023   TSH, Sensitive 0.30 - 4.50 uIU/mL 0.35     Prior TSH 0.21 on 6/20/23.   TSI 12/6/22: 5.4    Historical data:  CT ORBITAL W/O CONTRAST 12/6/2022 4:28 PM     History: ANKIT PROTOCOL: Probable thyroid eye disease. Strabismus,  evaluate for orbital mass or infiltrative disease of extraocular  muscles; Proptosis      Impression: Question bilateral borderline proptosis. No significant  extraocular muscle thickening to suggest thyroid orbitopathy.       Historical HPI: She was seen by Dr. Tate at [facility] in 6/2022. 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 IOP was 22/22 by applanation with report of full OCT RNFL. Also had cataract surgery both eyes about 3 years ago. She currently takes levothyroxine 88 mcg tablet post BLUM in 2007.      Her eye symptoms first started approximately several months ago. About 4 months ago, she will have difficult pinpoint sensation at the front of her eye. However, she does not know if there is additional pain with eye movements. This will occurs about once every week and half. Today, the patient states  "that her eyes are extremely light sensitive. Both eyes seem to be irritated. The left eye worse than the right eye. She needs to use Night Gel for relief. They also become watery, blurry, and achy many times. The tears will run down her face and cheek. She also feels that her left eye is more visible from the top lid area. The appearance of her eyes overall cause her significant distress.       In addition, she is having some difficulties with the prisms in her glasses where she will have to remove them.   She will have to remove from several hours to a day before readjusting back to the prism. She feels that intermittently her need for prisms resolved. The patient describes the feeling as \"not having the right prescription\".      She started wearing prisms around 6 years ago.      Referring physician: Dr. Edwige Tate     Thyroid history:  Diagnosed when? 2006  BLUM: 2007  Thyroidectomy: none     TSI: 5.4 (12/6/22)  TSH: 0.44 (5/17/2022)     Ocular history:   Orbital decompression: none  Strabismus surgery: none  Eyelid surgery: none     Medical history:        Patient Active Problem List   Diagnosis    Osteopenia    Localized Primary Osteoarthritis Of The Carpometacarpal Joint    Nicotine Dependence    Hypercholesterolemia    Osteoarthritis    Allergies    Hypothyroidism    Incomplete Emptying Of Bladder    Depression with anxiety    Status post total replacement of right shoulder    Status post total replacement of left shoulder         Patient has a current medication list which includes the following prescription(s): aspirin, cholecalciferol, citalopram, citalopram, hydromorphone, levothyroxine, loperamide, lovastatin, lovastatin, polyethylene glycol-propylene glycol, and senna-docusate.     Exam:   Melvi (base): 93 23/25 (from 23/26)     Better/worse same: stable  Strabismus (better/worse/same): relatively stable  Eyelid retraction (better/worse/same): stable     RICHARD Score:  1. Spontaneous orbital pain.        "                                            1 - left   2. Gaze evoked orbital pain.                                                   0  3. Eyelid swelling due to active thyroid eye disease               1  4. Eyelid erythema.                                                                 0  5. Conjunctival redness due to active thyroid eye disease     1  6. Chemosis.                                                                           1  7. Inflammation of caruncle OR plica.                                     0     Patients assessed after follow-up can be scored out of 10 by  including items 8-10.     8. Increase of > 2mm in proptosis.                                            0           9. Decrease in uniocular excursion in any direction of > 8 .     0  10. Decrease of acuity equivalent to 1 Snellen line.                 0     RICHARD SCORE = 4/10     Pichardo Diplopia Score = 0 with her glasses on  0 = no diplopia  1 = intermittent (when tired, upon waking, end of day etc)  2 = inconstant (extreme gazes)  3 = constant     Assessment & Plan     1. 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 She is not interested in orbital decompression for disfigurement from proptosis- she is only symptomatic from her erosions.    2.  Recurrent corneal erosions- patient is SEVERELY affected by these she reports. 5% sodium chloride hypertonic ointment helps prevent these events.  This is not directly related to her thyroid eye disease- follow-up with primary eye care provider       In the past we discussed Tepezza and patient was interested however 2 years ago she had a bout of microscopic colitis (IBD) so tepezza contraindicated.  Her disease is not severe enough at this point to recommend orbital radiation. Discussed pulse IV methylprednisolone (steroids) but these also have significant side-effects so for now mutually agreed to observe closely.      Today her thyroid eye  disease is essentially stable from 09/2023 and mildly active. She is most bothered by her recurrent erosions and we explained to her that orbital decompression for her proptosis is actually unlikely to significantly change her recurrent erosion symptoms. Despite her severe complaints about the erosions she says today that she has only had one attack since last visit while using the phoebe-ointment.     Patient will follow-up with primary eye care providers closer to home. Her most pressing ongoing management is that of her recurrent erosions which is not something that we managed in the thyroid eye disease clinic and she would need to see a corneal specialist for surgical options or she may continue to work with her home ophthalmologists using medical management.  Patient indicates she would prefer not to come back to the [facility] thyroid eye disease clinic due to the commute. It is reasonable for her to follow-up as needed with us         Alonso Landers MD  Resident Physician, PGY-2  Department of Ophthalmology     25 minutes were spent on the date of the encounter by me doing chart review, history and exam, documentation, and further activities as noted above    Complete documentation of historical and exam elements from today's encounter can be found in the full encounter summary report (not reduplicated in this progress note).  I personally obtained the chief complaint(s) and history of present illness.  I confirmed and edited as necessary the review of systems, past medical/surgical history, family history, social history, and examination findings as documented by others; and I examined the patient myself.  I personally reviewed the relevant tests, images, and reports as documented above.  I formulated and edited as necessary the assessment and plan and discussed the findings and management plan with the patient and family.  I personally reviewed the ophthalmic test(s) associated with this encounter,  agree with the interpretation(s) as documented by the resident/fellow, and have edited the corresponding report(s) as necessary.     Phoenix King MD

## 2024-09-29 ENCOUNTER — HEALTH MAINTENANCE LETTER (OUTPATIENT)
Age: 74
End: 2024-09-29

## 2025-03-06 ENCOUNTER — APPOINTMENT (OUTPATIENT)
Dept: URBAN - METROPOLITAN AREA CLINIC 260 | Age: 75
Setting detail: DERMATOLOGY
End: 2025-03-06

## 2025-03-06 VITALS — RESPIRATION RATE: 14 BRPM | WEIGHT: 140 LBS | HEIGHT: 63 IN

## 2025-03-06 DIAGNOSIS — L82.1 OTHER SEBORRHEIC KERATOSIS: ICD-10-CM

## 2025-03-06 DIAGNOSIS — L73.8 OTHER SPECIFIED FOLLICULAR DISORDERS: ICD-10-CM

## 2025-03-06 DIAGNOSIS — D18.0 HEMANGIOMA: ICD-10-CM

## 2025-03-06 DIAGNOSIS — L21.8 OTHER SEBORRHEIC DERMATITIS: ICD-10-CM

## 2025-03-06 DIAGNOSIS — L72.0 EPIDERMAL CYST: ICD-10-CM

## 2025-03-06 DIAGNOSIS — Z71.89 OTHER SPECIFIED COUNSELING: ICD-10-CM

## 2025-03-06 DIAGNOSIS — D22 MELANOCYTIC NEVI: ICD-10-CM

## 2025-03-06 DIAGNOSIS — L81.4 OTHER MELANIN HYPERPIGMENTATION: ICD-10-CM

## 2025-03-06 DIAGNOSIS — L98.8 OTHER SPECIFIED DISORDERS OF THE SKIN AND SUBCUTANEOUS TISSUE: ICD-10-CM

## 2025-03-06 DIAGNOSIS — L82.0 INFLAMED SEBORRHEIC KERATOSIS: ICD-10-CM

## 2025-03-06 PROBLEM — D22.5 MELANOCYTIC NEVI OF TRUNK: Status: ACTIVE | Noted: 2025-03-06

## 2025-03-06 PROBLEM — D18.01 HEMANGIOMA OF SKIN AND SUBCUTANEOUS TISSUE: Status: ACTIVE | Noted: 2025-03-06

## 2025-03-06 PROCEDURE — OTHER MIPS QUALITY: OTHER

## 2025-03-06 PROCEDURE — OTHER LIQUID NITROGEN: OTHER

## 2025-03-06 PROCEDURE — 99213 OFFICE O/P EST LOW 20 MIN: CPT | Mod: 25

## 2025-03-06 PROCEDURE — OTHER REASSURANCE: OTHER

## 2025-03-06 PROCEDURE — OTHER COUNSELING: OTHER

## 2025-03-06 PROCEDURE — 17110 DESTRUCT B9 LESION 1-14: CPT

## 2025-03-06 PROCEDURE — OTHER ADDITIONAL NOTES: OTHER

## 2025-03-06 ASSESSMENT — LOCATION ZONE DERM
LOCATION ZONE: LEG
LOCATION ZONE: FACE
LOCATION ZONE: NECK
LOCATION ZONE: TRUNK
LOCATION ZONE: LIP

## 2025-03-06 ASSESSMENT — LOCATION DETAILED DESCRIPTION DERM
LOCATION DETAILED: RIGHT INFERIOR LATERAL NECK
LOCATION DETAILED: RIGHT LATERAL MALAR CHEEK
LOCATION DETAILED: LEFT MEDIAL MALAR CHEEK
LOCATION DETAILED: RIGHT ANTERIOR PROXIMAL THIGH
LOCATION DETAILED: INFERIOR THORACIC SPINE
LOCATION DETAILED: RIGHT INFERIOR VERMILION LIP
LOCATION DETAILED: SUPERIOR THORACIC SPINE
LOCATION DETAILED: LEFT MEDIAL BUTTOCK
LOCATION DETAILED: SUPERIOR LUMBAR SPINE
LOCATION DETAILED: LEFT MEDIAL TRAPEZIAL NECK
LOCATION DETAILED: LEFT LATERAL MALAR CHEEK
LOCATION DETAILED: RIGHT MEDIAL MALAR CHEEK

## 2025-03-06 ASSESSMENT — LOCATION SIMPLE DESCRIPTION DERM
LOCATION SIMPLE: RIGHT LIP
LOCATION SIMPLE: LEFT CHEEK
LOCATION SIMPLE: RIGHT ANTERIOR NECK
LOCATION SIMPLE: POSTERIOR NECK
LOCATION SIMPLE: LOWER BACK
LOCATION SIMPLE: LEFT BUTTOCK
LOCATION SIMPLE: RIGHT THIGH
LOCATION SIMPLE: UPPER BACK
LOCATION SIMPLE: RIGHT CHEEK

## 2025-03-06 NOTE — PROCEDURE: ADDITIONAL NOTES
Additional Notes: Informed patient that she can wash her face with her anti-dandruff shampoo, let it soak for a few minutes before rinsing.
Render Risk Assessment In Note?: no
Detail Level: Simple
Additional Notes: Informed patient of what this is but informed her that it has been traumatized

## 2025-03-06 NOTE — HPI: FULL BODY SKIN EXAMINATION
How Severe Are Your Spot(S)?: moderate
What Type Of Note Output Would You Prefer (Optional)?: Standard Output
What Is The Reason For Today's Visit?: Full Body Skin Examination
What Is The Reason For Today's Visit? (Being Monitored For X): concerning skin lesions on an annual basis
Additional History: Patient reports that she has a spot in between her buttock that’s been there since her last visit, she reports that it wasn’t noticed by the provider at her last visit but reports it is still there and wants to make sure it’s checked out. She also reports a spot on her cheek that was treated before and is still very pink. And lastly a spot on her leg that she would like looked at that is very dark in color.

## 2025-06-29 ENCOUNTER — HEALTH MAINTENANCE LETTER (OUTPATIENT)
Age: 75
End: 2025-06-29